# Patient Record
Sex: FEMALE | Race: WHITE | NOT HISPANIC OR LATINO
[De-identification: names, ages, dates, MRNs, and addresses within clinical notes are randomized per-mention and may not be internally consistent; named-entity substitution may affect disease eponyms.]

---

## 2022-02-28 ENCOUNTER — NON-APPOINTMENT (OUTPATIENT)
Age: 47
End: 2022-02-28

## 2022-02-28 PROBLEM — Z00.00 ENCOUNTER FOR PREVENTIVE HEALTH EXAMINATION: Status: ACTIVE | Noted: 2022-02-28

## 2022-03-04 ENCOUNTER — NON-APPOINTMENT (OUTPATIENT)
Age: 47
End: 2022-03-04

## 2022-04-01 ENCOUNTER — APPOINTMENT (OUTPATIENT)
Dept: COLORECTAL SURGERY | Facility: CLINIC | Age: 47
End: 2022-04-01
Payer: COMMERCIAL

## 2022-04-01 VITALS
SYSTOLIC BLOOD PRESSURE: 114 MMHG | TEMPERATURE: 97.3 F | WEIGHT: 131 LBS | HEART RATE: 73 BPM | BODY MASS INDEX: 23.5 KG/M2 | DIASTOLIC BLOOD PRESSURE: 77 MMHG | HEIGHT: 62.5 IN

## 2022-04-01 DIAGNOSIS — Z78.9 OTHER SPECIFIED HEALTH STATUS: ICD-10-CM

## 2022-04-01 DIAGNOSIS — S83.419A SPRAIN OF MEDIAL COLLATERAL LIGAMENT OF UNSPECIFIED KNEE, INITIAL ENCOUNTER: ICD-10-CM

## 2022-04-01 DIAGNOSIS — Z78.0 ASYMPTOMATIC MENOPAUSAL STATE: ICD-10-CM

## 2022-04-01 DIAGNOSIS — K64.8 OTHER HEMORRHOIDS: ICD-10-CM

## 2022-04-01 DIAGNOSIS — Z87.828 PERSONAL HISTORY OF OTHER (HEALED) PHYSICAL INJURY AND TRAUMA: ICD-10-CM

## 2022-04-01 DIAGNOSIS — Z83.438 FAMILY HISTORY OF OTHER DISORDER OF LIPOPROTEIN METABOLISM AND OTHER LIPIDEMIA: ICD-10-CM

## 2022-04-01 DIAGNOSIS — Z84.1 FAMILY HISTORY OF DISORDERS OF KIDNEY AND URETER: ICD-10-CM

## 2022-04-01 DIAGNOSIS — Z86.79 PERSONAL HISTORY OF OTHER DISEASES OF THE CIRCULATORY SYSTEM: ICD-10-CM

## 2022-04-01 PROCEDURE — 46600 DIAGNOSTIC ANOSCOPY SPX: CPT

## 2022-04-01 PROCEDURE — 99202 OFFICE O/P NEW SF 15 MIN: CPT | Mod: 25

## 2022-04-01 NOTE — HISTORY OF PRESENT ILLNESS
[FreeTextEntry1] : Pt is a 48 yo F \par PMH denies\par PSH myomectomy x 3, right knee reconstructions- ACL, MCL, meniscus, osteotomy \par \par Pt was referred by Dr. Amadeo Chen s/p colonoscopy 2/22/22\par colonscopy- diffuse severe melanosis in the entire colon. Terminal ilieum appeared normal. Internal hemorrhoids were found during retroflexion. The hemorrhoids were small. There was a thickened hypertrophic anal papilla vs. other anal lesion observed with retroflexion. Biopsies were taken with a cold forceps for histology.\par Impression: melanosis coli-severe, examined portion of the ileum was normal, internal hemorrhoids, thickened hypertrophic anal papilla vs. other anal lesion, biospied. \par PATH- Anal squamous/ rectal junctional mucosa with hyperplastic/regenerative changes. \par Comment: No dysplasia, malignancy, or viral cytopathic effect seen. Immunostains for p16 and Ki-67 were examined for diagnosis. This case was revered in intradepartmental consultation. \par \par Pt presents due to this finding. No complaints today.\par \par Denies BRBPR, n/v/d/c, fever, chills, wt loss, rectal pain, abdominal pain\par of note, pt was taking "colon cleanse" daily for a year, was told it discolored lumen of colon.\par \par BH: once daily, no straining \par Adequate intake of dietary fiber- 25-35g per day and water 125 oz daily \par Denies use of stool softeners or fiber supplements\par \par Denies FMH colorectal CA- paternal Uncle leukemia, paternal lung cancer, father MDS\par Denies ASA/NSAIDs in last 7 days\par \par

## 2022-04-01 NOTE — PHYSICAL EXAM
[Excoriation] : no perianal excoriation [Normal] : was normal [None] : there was no rectal mass  [de-identified] : Small right anterior internal/external hemorrhoid.  Mild associated mucosal protrusion [FreeTextEntry1] : Medical assistant was present for the entire exam.\par \par Anoscopy was performed for evaluation of the patients rectal bleeding  history .\par The risks, benefits and alternatives were reviewed.\par \par A lighted anoscope was passed into the anal canal and the entire anal mucosal surface was inspected..  \par The findings revealed moderate internal hemorrhoids.\par No masses or lesions were identified.\par \par

## 2022-04-01 NOTE — ASSESSMENT
[FreeTextEntry1] : I reviewed with the patient that her findings on examination are consistent with a single quadrant internal/external hemorrhoid with mild protrusion.  Given the absence of her symptoms I suggested conservative management at this time.  No pathological or clinical appearance of significant pathology.  I have counseled her that if symptoms of protrusion/bleeding or discharge develop she be an excellent candidate for single quadrant rubber band ligation.  The risk, benefits alternatives were outlined.  All questions answered.

## 2023-11-09 ENCOUNTER — NON-APPOINTMENT (OUTPATIENT)
Age: 48
End: 2023-11-09

## 2023-12-19 ENCOUNTER — APPOINTMENT (OUTPATIENT)
Dept: NEPHROLOGY | Facility: CLINIC | Age: 48
End: 2023-12-19